# Patient Record
Sex: FEMALE | Race: WHITE | NOT HISPANIC OR LATINO | Employment: OTHER | URBAN - METROPOLITAN AREA
[De-identification: names, ages, dates, MRNs, and addresses within clinical notes are randomized per-mention and may not be internally consistent; named-entity substitution may affect disease eponyms.]

---

## 2017-01-04 ENCOUNTER — DOCUMENTATION ONLY (OUTPATIENT)
Dept: FAMILY MEDICINE | Facility: CLINIC | Age: 67
End: 2017-01-04

## 2017-01-04 NOTE — PROGRESS NOTES
Pre-Visit Chart Review  For Appointment Scheduled on 1-5-17    Health Maintenance Due   Topic Date Due    Eye Exam  07/19/1959    TETANUS VACCINE  07/19/1967    Colonoscopy  07/19/1999    Influenza Vaccine  08/01/2016

## 2017-01-05 NOTE — PROGRESS NOTES
Pre-Visit Chart Review  For Appointment Scheduled on 1-13-17    Health Maintenance Due   Topic Date Due    Eye Exam  07/19/1959    TETANUS VACCINE  07/19/1967    Colonoscopy  07/19/1999    Zoster Vaccine  07/19/2009    Pneumococcal (65+) (1 of 2 - PCV13) 07/19/2014    Mammogram  11/20/2015    Influenza Vaccine  08/01/2016    Hemoglobin A1c  11/22/2016

## 2017-01-13 ENCOUNTER — OFFICE VISIT (OUTPATIENT)
Dept: FAMILY MEDICINE | Facility: CLINIC | Age: 67
End: 2017-01-13
Payer: MEDICARE

## 2017-01-13 VITALS
OXYGEN SATURATION: 98 % | WEIGHT: 191.38 LBS | HEART RATE: 81 BPM | HEIGHT: 63 IN | RESPIRATION RATE: 12 BRPM | SYSTOLIC BLOOD PRESSURE: 102 MMHG | TEMPERATURE: 98 F | BODY MASS INDEX: 33.91 KG/M2 | DIASTOLIC BLOOD PRESSURE: 62 MMHG

## 2017-01-13 DIAGNOSIS — F45.41 STRESS HEADACHE: ICD-10-CM

## 2017-01-13 DIAGNOSIS — I73.9 PERIPHERAL VASCULAR DISEASE: ICD-10-CM

## 2017-01-13 DIAGNOSIS — E11.21 CONTROLLED TYPE 2 DIABETES MELLITUS WITH DIABETIC NEPHROPATHY, WITH LONG-TERM CURRENT USE OF INSULIN: Primary | Chronic | ICD-10-CM

## 2017-01-13 DIAGNOSIS — Z12.31 ENCOUNTER FOR SCREENING MAMMOGRAM FOR BREAST CANCER: ICD-10-CM

## 2017-01-13 DIAGNOSIS — Z79.4 CONTROLLED TYPE 2 DIABETES MELLITUS WITH DIABETIC NEPHROPATHY, WITH LONG-TERM CURRENT USE OF INSULIN: Primary | Chronic | ICD-10-CM

## 2017-01-13 DIAGNOSIS — Z12.11 COLON CANCER SCREENING: ICD-10-CM

## 2017-01-13 PROCEDURE — 99999 PR PBB SHADOW E&M-EST. PATIENT-LVL III: CPT | Mod: PBBFAC,,, | Performed by: FAMILY MEDICINE

## 2017-01-13 PROCEDURE — 99214 OFFICE O/P EST MOD 30 MIN: CPT | Mod: S$PBB,,, | Performed by: FAMILY MEDICINE

## 2017-01-13 PROCEDURE — 99213 OFFICE O/P EST LOW 20 MIN: CPT | Mod: PBBFAC,PO | Performed by: FAMILY MEDICINE

## 2017-01-13 RX ORDER — TRAMADOL HYDROCHLORIDE 50 MG/1
50 TABLET ORAL 2 TIMES DAILY
Qty: 60 TABLET | Refills: 0 | Status: SHIPPED | OUTPATIENT
Start: 2017-01-13

## 2017-01-13 RX ORDER — SULFAMETHOXAZOLE AND TRIMETHOPRIM 800; 160 MG/1; MG/1
TABLET ORAL
Refills: 0 | COMMUNITY
Start: 2017-01-09 | End: 2017-03-08 | Stop reason: ALTCHOICE

## 2017-01-13 RX ORDER — HYDROCODONE BITARTRATE AND ACETAMINOPHEN 5; 325 MG/1; MG/1
TABLET ORAL
Refills: 0 | COMMUNITY
Start: 2016-12-28 | End: 2017-03-08 | Stop reason: ALTCHOICE

## 2017-01-13 NOTE — PROGRESS NOTES
Subjective:       Patient ID: Aria Mcnulty is a 66 y.o. female.    Chief Complaint: Medication Refill    HPI Comments: 66 year old female with chronic pain secondary to peripheral ischemic vascular disease with multiple amputations of fingers, toes, left leg.  She had her last toes amputated about two months ago by Dr. Linares and is still having bleeding problems from the stumps.  She is in dialysis at [facility] with lessening problems with hypotension by her history.  She is due for mammogram and a1c and will do that with other lab at [facility] next week.  She needs a refill of tramadol.  Dr. Linares gave her a small amount of norco she uses only for extreme pain in the right foot from surgical site.      Past Medical History:    A-V fistula                                                     Comment:RIGHT upper arm    Charcot foot due to diabetes mellitus                         CHF (congestive heart failure)                                COPD (chronic obstructive pulmonary disease)                  Diabetes mellitus                                             Diabetes mellitus type II                                     Dialysis patient                                started N*      Comment:Tues, Thurs, Sat    Encounter for blood transfusion                               Hyperlipidemia                                                Hypertension                                                  Osteomyelitis                                                   Comment:RIGHT FOOT    Renal disorder                                                Sleep apnea                                                   Thyroid disease                                                 Comment:hypo    Past Surgical History:    APPENDECTOMY                                                   LEG SURGERY                                                      Comment:on R x 4    LEG AMPUTATION THROUGH LOWER TIBIA AND FIBULA                     Comment:Left    FOOT SURGERY                                                     Comment:x 5    VASCULAR SURGERY                                Right 7/2014          Comment:AV Fistula    FINGER AMPUTATION                               Left                 Comment:index finger--first digit    FINGER AMPUTATION                               Right                 Comment:index finger amp--first digit then all of                finger amputated     AV FISTULA PLACEMENT                            Right 7/22/14       FINGER AMPUTATION                               Right 8/2015        AMPUTATION OF REPLICATED TOES                    12/24/2016      Comment:Dr Linares [facility]    Eye Exam due on 07/19/1959  TETANUS VACCINE due on 07/19/1967  Colonoscopy due on 07/19/1999  Zoster Vaccine due on 07/19/2009  Mammogram due on 11/20/2015  Hemoglobin A1c due on 11/22/2016      Medication Refill   Associated symptoms include arthralgias, fatigue and myalgias. Pertinent negatives include no chest pain, chills, diaphoresis, fever, nausea or vomiting.     Review of Systems   Constitutional: Positive for fatigue. Negative for chills, diaphoresis and fever.   Respiratory: Negative for chest tightness and shortness of breath.    Cardiovascular: Negative for chest pain and palpitations.   Gastrointestinal: Negative for nausea and vomiting.   Musculoskeletal: Positive for arthralgias and myalgias.   Skin: Positive for wound.       Objective:      Physical Exam   Constitutional: She is oriented to person, place, and time. She appears well-developed. No distress.   Good blood pressure control  Patient is obese with bmi of 33.9.   Weight is decreased by 11.2lb since last visit of 8/22/16.     Neurological: She is alert and oriented to person, place, and time.   Skin: She is not diaphoretic.   Psychiatric: She has a normal mood and affect. Her behavior is normal. Judgment and thought content normal.   Nursing note and vitals reviewed.       Assessment:       1. Controlled type 2 diabetes mellitus with diabetic nephropathy, with long-term current use of insulin    2. Encounter for screening mammogram for breast cancer    3. Colon cancer screening    4. Stress headache    5. Peripheral vascular disease        Plan:       1. Controlled type 2 diabetes mellitus with diabetic nephropathy, with long-term current use of insulin  Will do a1c at [facility]    2. Encounter for screening mammogram for breast cancer  - Mammo Digital Screening Bilat with CAD; Future    3. Colon cancer screening  - POCT Hemocult Stool X3    4. Stress headache  - tramadol (ULTRAM) 50 mg tablet; Take 1 tablet (50 mg total) by mouth 2 (two) times daily.  Dispense: 60 tablet; Refill: 0    5. Peripheral vascular disease  Multiple amputations

## 2017-01-13 NOTE — MR AVS SNAPSHOT
[facility]  [address]  [place] [address]  Phone: [phone]  Fax: [phone]                  Aria Mcnulty   2017 2:20 PM   Office Visit    Description:  Female : 1950   Provider:  Humberto Regalado MD   Department:  [place] [facility]           Reason for Visit     Medication Refill           Diagnoses this Visit        Comments    Controlled type 2 diabetes mellitus with diabetic nephropathy, with long-term current use of insulin    -  Primary     Encounter for screening mammogram for breast cancer         Colon cancer screening         Stress headache                To Do List           Future Appointments        Provider Department Dept Phone    2017 3:00 PM [facility] MAMMO1 [facility] [phone]      Goals (5 Years of Data)     None       These Medications        Disp Refills Start End    tramadol (ULTRAM) 50 mg tablet 60 tablet 0 2017     Take 1 tablet (50 mg total) by mouth 2 (two) times daily. - Oral    Pharmacy: [facility] [address] [place] [address] Ph #: [phone]         [facility] On Call     [facility] On Call Nurse Care Line -  Assistance  Registered nurses in the [facility] On Call Center provide clinical advisement, health education, appointment booking, and other advisory services.  Call for this free service at [phone].             Medications           Message regarding Medications     Verify the changes and/or additions to your medication regime listed below are the same as discussed with your clinician today.  If any of these changes or additions are incorrect, please notify your healthcare provider.        STOP taking these medications     cephALEXin (KEFLEX) 500 MG capsule 1 Q 12 H FOR 7 DAYS           Verify that the below list of medications is an accurate representation of the medications you are currently taking.  If none reported, the list may be  "blank. If incorrect, please contact your healthcare provider. Carry this list with you in case of emergency.           Current Medications     acetaminophen-codeine 300-60mg (TYLENOL #4) 300-60 mg Tab Take 1 tablet by mouth 4 (four) times daily as needed.    calcium acetate (PHOSLO) 667 mg capsule Take 1,334 mg by mouth 4 (four) times daily.     collagenase (SANTYL) ointment Apply topical    furosemide (LASIX) 40 MG tablet Take 40 mg by mouth once daily.     hydrocodone-acetaminophen 5-325mg (NORCO) 5-325 mg per tablet TK 1 T PO  Q 8 H PRN    lidocaine-prilocaine (EMLA) cream Apply topically as needed.     metolazone (ZAROXOLYN) 5 MG tablet Take 5 mg by mouth every Monday and Friday. Every Monday and Friday only    pen needle, diabetic 32 gauge x 5/16" Ndle Use with levemir at bedtime    promethazine (PHENERGAN) 25 MG tablet 1 EVERY 4 HOURS    sevelamer carbonate (RENVELA) 800 mg Tab Take 800 mg by mouth 3 (three) times daily with meals.    silver sulfADIAZINE 1% (SILVADENE) 1 % cream Apply topically 2 (two) times daily.    sulfamethoxazole-trimethoprim 800-160mg (BACTRIM DS) 800-160 mg Tab TK 1 T PO BID    clopidogrel (PLAVIX) 75 mg tablet Take 1 tablet (75 mg total) by mouth once daily.    insulin detemir (LEVEMIR FLEXTOUCH) 100 unit/mL (3 mL) SubQ InPn pen Inject 10 Units into the skin every evening.    pravastatin (PRAVACHOL) 10 MG tablet Take 1 tablet (10 mg total) by mouth every evening.    tramadol (ULTRAM) 50 mg tablet Take 1 tablet (50 mg total) by mouth 2 (two) times daily.           Clinical Reference Information           Vital Signs - Last Recorded  Most recent update: 1/13/2017  3:48 PM by Ely Lomeli MA    BP Pulse Temp Resp Ht Wt    102/62 (BP Location: Right arm, Patient Position: Sitting, BP Method: Automatic) 81 98 °F (36.7 °C) (Oral) 12 5' 3" (1.6 m) 86.8 kg (191 lb 5.8 oz)    SpO2 BMI             98% 33.9 kg/m2         Blood Pressure          Most Recent Value    BP  102/62      Allergies as " of 1/13/2017     Cat/feline Products    Pollen Extracts    Adhesive      Immunizations Administered on Date of Encounter - 1/13/2017     None      Orders Placed During Today's Visit      Normal Orders This Visit    POCT Hemocult Stool X3     Future Labs/Procedures Expected by Expires    Mammo Digital Screening Bilat with CAD  1/13/2017 3/16/2018      Maintenance Dialysis History     Start End Type Comments Center    11/11/2014  [place]  [facility]            Current Dialysis Center Information     [facility] [place] Phone #:  [phone]    Contact:  [place]  [address] Fax #:  [phone]            Instructions      Diabetes (General Information)  Diabetes is a long-term health problem. It means your body does not make enough insulin. Or it may mean that your body cannot use the insulin it makes. Insulin is a hormone in your body. It lets blood sugar (glucose) reach the cells in your body. All of your cells need glucose for fuel.  When you have diabetes, the glucose in your blood builds up because it cannot get into the cells. This buildup is called high blood sugar (hyperglycemia).  Your blood sugar level depends on several things. It depends on what kind of food you eat and how much of it you eat. It also depends on how much exercise you get, and how much insulin you have in your body. Eating too much of the wrong kinds of food or not taking diabetes medicine on time can cause high blood sugar. Infections can cause high blood sugar even if you are taking medicines correctly.  These things can also cause low blood sugar:  · Missing meals  · Not eating enough food  · Taking too much diabetes medicine  Diabetes can cause serious problems over time if you do not get treated. These problems include heart disease, stroke, kidney failure, and blindness. They also include nerve pain or loss of feeling in your legs and feet, and gangrene of the feet. By keeping your blood  sugar under control you can prevent or delay these problems.  Normal blood sugar levels are 80 to 100 before a meal and less than 180 in the 1 to 2 hours after a meal.  Home care  Follow these guidelines when caring for yourself at home:  · Follow the diet your healthcare provider gives you. Take insulin or other diabetes medicine exactly as told to.  · Watch your blood sugar as you are told to. Keep a log of your results. This will help your provider change your medicines to keep your blood sugar under control.  · Try to reach your ideal weight. You may be able to cut back on or not have to take diabetes medicine if you eat the right foods and get exercise.  · Do not smoke. Smoking worsens the effects of diabetes on your circulation. You are much more likely to have a heart attack if you have diabetes and you smoke.  · Take good care of your feet. If you have lost feeling in your feet, you may not see an injury or infection. Check your feet and between your toes at least once a week.  · Wear a medical alert bracelet or necklace, or carry a card in your wallet that says you have diabetes. This will help healthcare providers give you the right care if you get very ill and cannot tell them that you have diabetes.  Sick day plan  If you get a cold, the flu, or a bacterial or viral infection, take these steps:  · Look at your diabetes sick plan and call your healthcare provider as you were told to. You may need to call your provider right away if:  ¨ Your blood sugar is above 240 while taking your diabetes medicine  ¨ Your urine ketone levels are above normal or high  ¨ You have been vomiting more than 6 hours  ¨ You have trouble breathing or your breath ha s a fruity smell  ¨ You have a high fever  ¨ You have a fever for several days and you are not getting better  ¨ You get light-headed and are sleepier than usual  · Keep taking your diabetes pills (oral medicine) even if you have been vomiting and are feeling sick.  Call your provider right away because you may need insulin to lower your blood sugar until you recover from your illness.  · Keep taking your insulin even if you have been vomiting and are feeling sick. Call your provider right away to ask if you need to change your insulin dose. This will depend on your blood sugar results.  · Check your blood sugar every 2 to 4 hours, or at least 4 times a day.  · Check your ketones often. If you are vomiting and having diarrhea, watch them more often.  · Do not skip meals. Try to eat small meals on a regular schedule. Do this even if you do not feel like eating.  · Drink water or other liquids that do not have caffeine or calories. This will keep you from getting dehydrated. If you are nauseated or vomiting, takes small sips every 5 minutes. To prevent dehydration try to drink a cup (8 ounces) of fluids every hour while you are awake.  General care  Always bring a source of fast-acting sugar with you in case you have symptoms of low blood sugar (below 70). At the first sign of low blood sugar, eat or drink 15 to 20 grams of fast-acting sugar to raise your blood sugar. Examples are:  · 3 to 4 glucose tablets. You can buy these at most [employer] [employer].  · 4 ounces (1/2 cup) of regular (not diet) soft drinks  · 4 ounces (1/2 cup) of any fruit juice  · 8 ounces (1 cup) of milk  · 5 to 6 pieces of hard candy  · 1 tablespoon of honey  Check your blood sugar 15 minutes after treating yourself. If it is still below 70, take 15 to 20 more grams of fast-acting sugar. Test again in 15 minutes. If it returns to normal (70 or above), eat a snack or meal to keep your blood sugar in a safe range. If it stays low, call your doctor or go to an emergency room.  Follow-up care  Follow-up with your healthcare provider, or as advised. For more information about diabetes, visit the American Diabetes Association website at www.diabetes.org or call 695-417-1187.  When to seek medical advice  Call your  healthcare provider right away if you have any of these symptoms of high blood sugar:  · Frequent urination  · Dizziness  · Drowsiness  · Thirst  · Headache  · Nausea or vomiting  · Abdominal pain  · Eyesight changes  · Fast breathing  · Confusion or loss of consciousness  Also call your provider right away if you have any of these signs of low blood sugar:  · Fatigue  · Headache  · Shakes  · Excess sweating  · Hunger  · Feeling anxious or restless  · Eyesight changes  · Drowsiness  · Weakness  · Confusion or loss of consciousness  Call 911  Call for emergency help right away if any of these occur:  · Chest pain or shortness of breath  · Dizziness or fainting  · Weakness of an arm or leg or one side of the face  · Trouble speaking or seeing   © 2238-6541 [employer]. [address] [place] [address], [place] [address]. All rights reserved. This information is not intended as a substitute for professional medical care. Always follow your healthcare professional's instructions.

## 2017-01-13 NOTE — PATIENT INSTRUCTIONS
Diabetes (General Information)  Diabetes is a long-term health problem. It means your body does not make enough insulin. Or it may mean that your body cannot use the insulin it makes. Insulin is a hormone in your body. It lets blood sugar (glucose) reach the cells in your body. All of your cells need glucose for fuel.  When you have diabetes, the glucose in your blood builds up because it cannot get into the cells. This buildup is called high blood sugar (hyperglycemia).  Your blood sugar level depends on several things. It depends on what kind of food you eat and how much of it you eat. It also depends on how much exercise you get, and how much insulin you have in your body. Eating too much of the wrong kinds of food or not taking diabetes medicine on time can cause high blood sugar. Infections can cause high blood sugar even if you are taking medicines correctly.  These things can also cause low blood sugar:  · Missing meals  · Not eating enough food  · Taking too much diabetes medicine  Diabetes can cause serious problems over time if you do not get treated. These problems include heart disease, stroke, kidney failure, and blindness. They also include nerve pain or loss of feeling in your legs and feet, and gangrene of the feet. By keeping your blood sugar under control you can prevent or delay these problems.  Normal blood sugar levels are 80 to 100 before a meal and less than 180 in the 1 to 2 hours after a meal.  Home care  Follow these guidelines when caring for yourself at home:  · Follow the diet your healthcare provider gives you. Take insulin or other diabetes medicine exactly as told to.  · Watch your blood sugar as you are told to. Keep a log of your results. This will help your provider change your medicines to keep your blood sugar under control.  · Try to reach your ideal weight. You may be able to cut back on or not have to take diabetes medicine if you eat the right foods and get exercise.  · Do  not smoke. Smoking worsens the effects of diabetes on your circulation. You are much more likely to have a heart attack if you have diabetes and you smoke.  · Take good care of your feet. If you have lost feeling in your feet, you may not see an injury or infection. Check your feet and between your toes at least once a week.  · Wear a medical alert bracelet or necklace, or carry a card in your wallet that says you have diabetes. This will help healthcare providers give you the right care if you get very ill and cannot tell them that you have diabetes.  Sick day plan  If you get a cold, the flu, or a bacterial or viral infection, take these steps:  · Look at your diabetes sick plan and call your healthcare provider as you were told to. You may need to call your provider right away if:  ¨ Your blood sugar is above 240 while taking your diabetes medicine  ¨ Your urine ketone levels are above normal or high  ¨ You have been vomiting more than 6 hours  ¨ You have trouble breathing or your breath ha s a fruity smell  ¨ You have a high fever  ¨ You have a fever for several days and you are not getting better  ¨ You get light-headed and are sleepier than usual  · Keep taking your diabetes pills (oral medicine) even if you have been vomiting and are feeling sick. Call your provider right away because you may need insulin to lower your blood sugar until you recover from your illness.  · Keep taking your insulin even if you have been vomiting and are feeling sick. Call your provider right away to ask if you need to change your insulin dose. This will depend on your blood sugar results.  · Check your blood sugar every 2 to 4 hours, or at least 4 times a day.  · Check your ketones often. If you are vomiting and having diarrhea, watch them more often.  · Do not skip meals. Try to eat small meals on a regular schedule. Do this even if you do not feel like eating.  · Drink water or other liquids that do not have caffeine or  calories. This will keep you from getting dehydrated. If you are nauseated or vomiting, takes small sips every 5 minutes. To prevent dehydration try to drink a cup (8 ounces) of fluids every hour while you are awake.  General care  Always bring a source of fast-acting sugar with you in case you have symptoms of low blood sugar (below 70). At the first sign of low blood sugar, eat or drink 15 to 20 grams of fast-acting sugar to raise your blood sugar. Examples are:  · 3 to 4 glucose tablets. You can buy these at most [facility].  · 4 ounces (1/2 cup) of regular (not diet) soft drinks  · 4 ounces (1/2 cup) of any fruit juice  · 8 ounces (1 cup) of milk  · 5 to 6 pieces of hard candy  · 1 tablespoon of honey  Check your blood sugar 15 minutes after treating yourself. If it is still below 70, take 15 to 20 more grams of fast-acting sugar. Test again in 15 minutes. If it returns to normal (70 or above), eat a snack or meal to keep your blood sugar in a safe range. If it stays low, call your doctor or go to an emergency room.  Follow-up care  Follow-up with your healthcare provider, or as advised. For more information about diabetes, visit the American Diabetes Association website at www.diabetes.org or call 126-110-0952.  When to seek medical advice  Call your healthcare provider right away if you have any of these symptoms of high blood sugar:  · Frequent urination  · Dizziness  · Drowsiness  · Thirst  · Headache  · Nausea or vomiting  · Abdominal pain  · Eyesight changes  · Fast breathing  · Confusion or loss of consciousness  Also call your provider right away if you have any of these signs of low blood sugar:  · Fatigue  · Headache  · Shakes  · Excess sweating  · Hunger  · Feeling anxious or restless  · Eyesight changes  · Drowsiness  · Weakness  · Confusion or loss of consciousness  Call 911  Call for emergency help right away if any of these occur:  · Chest pain or shortness of breath  · Dizziness or  fainting  · Weakness of an arm or leg or one side of the face  · Trouble speaking or seeing   © 7266-6023 [employer]. [address] [place] [address], [place] [address]. All rights reserved. This information is not intended as a substitute for professional medical care. Always follow your healthcare professional's instructions.

## 2017-01-27 ENCOUNTER — TELEPHONE (OUTPATIENT)
Dept: FAMILY MEDICINE | Facility: CLINIC | Age: 67
End: 2017-01-27

## 2017-01-27 NOTE — TELEPHONE ENCOUNTER
LVM for pt to return call to clinic regarding if she has recently had an eye exam done. If she has please find out where so we can send off a request for report. Also offered an apt within [facility] if she doesn't already have a doctor.

## 2017-02-23 ENCOUNTER — TELEPHONE (OUTPATIENT)
Dept: FAMILY MEDICINE | Facility: CLINIC | Age: 67
End: 2017-02-23

## 2017-02-23 NOTE — TELEPHONE ENCOUNTER
Received lab report from [facility] ordered by Dr Isbell.   Glucose 160 H . mg/dl  70- 105  (fyi)

## 2017-02-28 NOTE — TELEPHONE ENCOUNTER
Three months overdue for a1c.  It is difficult having to depend on [facility] for labs.  Would recommend we sent her to endocrine clinic.

## 2017-03-01 ENCOUNTER — TELEPHONE (OUTPATIENT)
Dept: FAMILY MEDICINE | Facility: CLINIC | Age: 67
End: 2017-03-01

## 2017-03-01 NOTE — TELEPHONE ENCOUNTER
----- Message from Wendy Carlson sent at 3/1/2017  2:29 PM CST -----  Contact: self [phone]  Patient is requesting a call back from the nurse stated legs puffy, requesting sooner appt than whats offered.   Please call the patient upon request at phone number [phone].

## 2017-03-02 ENCOUNTER — DOCUMENTATION ONLY (OUTPATIENT)
Dept: FAMILY MEDICINE | Facility: CLINIC | Age: 67
End: 2017-03-02

## 2017-03-02 NOTE — PROGRESS NOTES
Pre-Visit Chart Review  For Appointment Scheduled on 3-8-17    Health Maintenance Due   Topic Date Due    Eye Exam  07/19/1959    TETANUS VACCINE  07/19/1967    Colonoscopy  07/19/1999    Zoster Vaccine  07/19/2009    Mammogram  11/20/2015    Hemoglobin A1c  11/22/2016

## 2017-03-08 ENCOUNTER — OFFICE VISIT (OUTPATIENT)
Dept: FAMILY MEDICINE | Facility: CLINIC | Age: 67
End: 2017-03-08
Payer: MEDICARE

## 2017-03-08 VITALS — BODY MASS INDEX: 32.5 KG/M2 | RESPIRATION RATE: 12 BRPM | WEIGHT: 183.44 LBS | HEIGHT: 63 IN | TEMPERATURE: 98 F

## 2017-03-08 DIAGNOSIS — R60.9 EDEMA, UNSPECIFIED TYPE: Primary | ICD-10-CM

## 2017-03-08 DIAGNOSIS — G89.29 OTHER CHRONIC PAIN: ICD-10-CM

## 2017-03-08 DIAGNOSIS — N18.5 CKD (CHRONIC KIDNEY DISEASE) STAGE 5, GFR LESS THAN 15 ML/MIN: ICD-10-CM

## 2017-03-08 PROCEDURE — 99999 PR PBB SHADOW E&M-EST. PATIENT-LVL III: CPT | Mod: PBBFAC,,, | Performed by: FAMILY MEDICINE

## 2017-03-08 PROCEDURE — 99213 OFFICE O/P EST LOW 20 MIN: CPT | Mod: PBBFAC,PO | Performed by: FAMILY MEDICINE

## 2017-03-08 PROCEDURE — 99214 OFFICE O/P EST MOD 30 MIN: CPT | Mod: S$PBB,,, | Performed by: FAMILY MEDICINE

## 2017-03-08 RX ORDER — ACETAMINOPHEN AND CODEINE PHOSPHATE 300; 60 MG/1; MG/1
1 TABLET ORAL 4 TIMES DAILY PRN
Qty: 120 TABLET | Refills: 0 | Status: SHIPPED | OUTPATIENT
Start: 2017-03-08 | End: 2017-06-06 | Stop reason: SDUPTHER

## 2017-03-08 RX ORDER — POLYETHYLENE GLYCOL 3350 17 G/17G
POWDER, FOR SOLUTION ORAL
Refills: 0 | COMMUNITY
Start: 2017-02-15 | End: 2017-06-06 | Stop reason: ALTCHOICE

## 2017-03-08 NOTE — PATIENT INSTRUCTIONS
Weight Management: Getting Started  Healthy bodies come in all shapes and sizes. Not all bodies are made to be thin. For some people, a healthy weight is higher than the average weight listed on weight charts. Your healthcare provider can help you decide on a healthy weight for you.    Reasons to lose weight  Losing weight can help with some health problems, such as high blood pressure, heart disease, diabetes, sleep apnea, and arthritis. You may also feel more energy.  Set your long-term goal  Your goal doesn't even have to be a specific weight. You may decide on a fitness goal (such as being able to walk 10 miles a week), or a health goal (such as lowering your blood pressure). Choose a goal that is measurable and reasonable, so you know when you've reached it. A goal of reaching a BMI of less than 25 is not always reasonable (or possible).   Make an action plan  Habits dont change overnight. Setting your goals too high can leave you feeling discouraged if you cant reach them. Be realistic. Choose one or two small changes you can make now. Set an action plan for how you are going to make these changes. When you can stick to this plan, keep making a few more small changes. Taking small steps will help you stay on the path to success.  Track your progress  Write down your goals. Then, keep a daily record of your progress. Write down what you eat and how active you are. This record lets you look back on how much youve done. It may also help when youre feeling frustrated. Reward yourself for success. Even if you dont reach every goal, give yourself credit for what you do get done.  Get support  Encouragement from others can help make losing weight easier. Ask your family members and friends for support. They may even want to join you. Also look to your healthcare provider, registered dietitian, and  for help. Your local hospital can give you more information about nutrition, exercise, and  weight loss.  Date Last Reviewed: 1/31/2016  © 0581-8324 [employer]. [address] [place] [address], [place] [address]. All rights reserved. This information is not intended as a substitute for professional medical care. Always follow your healthcare professional's instructions.

## 2017-03-08 NOTE — PROGRESS NOTES
Subjective:       Patient ID: Aria Mcnulty is a 66 y.o. female.    Chief Complaint: Leg Swelling    HPI Comments: 66 year old female comes in for edema.  She is a severe diabetic with peripheral vascular disease and stage five kidney disease on dialysis at [facility].  She just got out of [facility] having had her remaining toes on the right foot amputated.  Dialysis pulled off extra fluid and largely resolved the edema.  Home health was wrapping the right leg but has stopped lately.      Past Medical History:  No date: A-V fistula      Comment: RIGHT upper arm  No date: Charcot foot due to diabetes mellitus  No date: CHF (congestive heart failure)  No date: COPD (chronic obstructive pulmonary disease)  No date: Diabetes mellitus  No date: Diabetes mellitus type II  started Nov 2014: Dialysis patient      Comment: Tues, Thurs, Sat  No date: Encounter for blood transfusion  No date: Hyperlipidemia  No date: Hypertension  No date: Osteomyelitis      Comment: RIGHT FOOT  No date: Renal disorder  No date: Sleep apnea  No date: Thyroid disease      Comment: hypo    Past Surgical History:  12/24/2016: AMPUTATION OF REPLICATED TOES      Comment: Dr Linares [facility]  No date: APPENDECTOMY  7/22/14: AV FISTULA PLACEMENT Right  No date: FINGER AMPUTATION Left      Comment: index finger--first digit  No date: FINGER AMPUTATION Right      Comment: index finger amp--first digit then all of                finger amputated   8/2015: FINGER AMPUTATION Right  No date: FOOT SURGERY      Comment: x 5  No date: LEG AMPUTATION THROUGH LOWER TIBIA AND FIBULA      Comment: Left  No date: LEG SURGERY      Comment: on R x 4  7/2014: VASCULAR SURGERY Right      Comment: AV Fistula        Review of Systems   Cardiovascular: Positive for leg swelling.   Skin: Positive for wound (slowly healing wounds on right lower leg).       Objective:      Physical Exam   Constitutional: She is oriented to person, place, and time. She appears well-developed. No distress.  "  ---------------------------------                  03/08/17                            1101            ---------------------------------   Resp:             12              Temp:      98.1 °F (36.7 °C)      TempSrc:         Oral             Weight: 83.2 kg (183 lb 6.8 oz)   Height:      5' 3" (1.6 m)       ---------------------------------  Unable to obtain blood pressure, no reading  Patient is obese with bmi of 32.5.   Weight is decreased by 7.9lb since last visit of 1/13/17.     Cardiovascular: Normal rate and regular rhythm.    Pulmonary/Chest: Effort normal and breath sounds normal.   Musculoskeletal:   Left leg amputated, no toes on right foot, glenn of fingers on right hand only   Neurological: She is alert and oriented to person, place, and time.   Skin: She is not diaphoretic.   Psychiatric: She has a normal mood and affect. Her behavior is normal. Judgment and thought content normal.   Nursing note and vitals reviewed.      Assessment:       1. Other chronic pain    2. Uncontrolled type 2 diabetes mellitus with nephropathy        Plan:       1. Edema, unspecified type  Related to renal function, she has a moderate salt intake-ate steak and baked potato last night    2. CKD (chronic kidney disease) stage 5, GFR less than 15 ml/min    3. Uncontrolled type 2 diabetes mellitus with nephropathy  In dialysis at [facility]  - Hemoglobin A1c; Future    4. Other chronic pain  - acetaminophen-codeine 300-60mg (TYLENOL #4) 300-60 mg Tab; Take 1 tablet by mouth 4 (four) times daily as needed.  Dispense: 120 tablet; Refill: 0       "

## 2017-03-09 ENCOUNTER — TELEPHONE (OUTPATIENT)
Dept: FAMILY MEDICINE | Facility: CLINIC | Age: 67
End: 2017-03-09

## 2017-03-09 NOTE — TELEPHONE ENCOUNTER
Reviewed results, recent lipid panel added to HM.  No changes needed, glucose and lipids are well controlled.

## 2017-03-10 ENCOUNTER — TELEPHONE (OUTPATIENT)
Dept: FAMILY MEDICINE | Facility: CLINIC | Age: 67
End: 2017-03-10

## 2017-03-10 NOTE — TELEPHONE ENCOUNTER
Naveed with Hal  called -bp  78/38 82/38 88/42- at [facility] pressure was in 90s- pulse 72 sats 93-95-patient looks jaundiced -took 4L off at dialysis yesterday.  nurse told to have patient taken to er.

## 2017-03-11 ENCOUNTER — HISTORICAL (OUTPATIENT)
Dept: ADMINISTRATIVE | Facility: HOSPITAL | Age: 67
End: 2017-03-11

## 2017-03-11 LAB — GLUCOSE SERPL-MCNC: 277 MG/DL (ref 70–99)

## 2017-04-19 ENCOUNTER — HISTORICAL (OUTPATIENT)
Dept: ADMINISTRATIVE | Facility: HOSPITAL | Age: 67
End: 2017-04-19

## 2017-04-19 LAB
GRAM STAIN: NORMAL

## 2017-04-20 ENCOUNTER — TELEPHONE (OUTPATIENT)
Dept: FAMILY MEDICINE | Facility: CLINIC | Age: 67
End: 2017-04-20

## 2017-04-20 NOTE — TELEPHONE ENCOUNTER
Patient was in [facility] then to [facility]- appt made 5/5/17- records request from [facility].

## 2017-04-20 NOTE — TELEPHONE ENCOUNTER
----- Message from Reny Izquierdo sent at 4/20/2017  1:37 PM CDT -----  Contact: marzena with [facility] [phone]  Marzena with [facility] called regarding a Follow up appointment from rehab please call back at  [phone] to confirm. I tried to schedule appointment.stated was too far out. Thanks,

## 2017-05-02 ENCOUNTER — TELEPHONE (OUTPATIENT)
Dept: FAMILY MEDICINE | Facility: CLINIC | Age: 67
End: 2017-05-02

## 2017-05-02 NOTE — TELEPHONE ENCOUNTER
----- Message from RT Telly sent at 5/2/2017 10:41 AM CDT -----  Contact: CHRISTINA Hernandez, [phone] [facility]  CHRISTINA Hernandez, [phone] [facility], requesting to inform the pt's wound is draining very much and would like a wound vac for her, thanks

## 2017-05-02 NOTE — TELEPHONE ENCOUNTER
Per Mary wound nurse- right stump wound is draining and asking for wound vac order. Was in [facility] in April for gangrene. Records are in media. Will need written order to fax to [facility].

## 2017-05-03 DIAGNOSIS — E11.9 TYPE 2 DIABETES, HBA1C GOAL < 7%: Primary | ICD-10-CM

## 2017-05-04 ENCOUNTER — DOCUMENTATION ONLY (OUTPATIENT)
Dept: FAMILY MEDICINE | Facility: CLINIC | Age: 67
End: 2017-05-04

## 2017-05-04 ENCOUNTER — TELEPHONE (OUTPATIENT)
Dept: FAMILY MEDICINE | Facility: CLINIC | Age: 67
End: 2017-05-04

## 2017-05-04 NOTE — TELEPHONE ENCOUNTER
----- Message from Jenna Robledo sent at 5/4/2017 10:50 AM CDT -----  Liana with [employer] calling concerning Woundvac order/stated faxed over forms to be signed and dated/if any questions call back at [phone].

## 2017-05-04 NOTE — TELEPHONE ENCOUNTER
Spoke to Liana with [employer]. Informed her that we did not receive the faxed forms that she sent.  She is re-faxing to correct fax # [phone].   Please fax back to [phone] when complete.

## 2017-05-04 NOTE — PROGRESS NOTES
Pre-Visit Chart Review  For Appointment Scheduled on 05/05/2017      Health Maintenance Due   Topic Date Due    Eye Exam  07/19/1959    TETANUS VACCINE  07/19/1967    Colonoscopy  07/19/1999    Zoster Vaccine  07/19/2009    Mammogram  11/20/2015

## 2017-05-05 ENCOUNTER — OFFICE VISIT (OUTPATIENT)
Dept: FAMILY MEDICINE | Facility: CLINIC | Age: 67
End: 2017-05-05
Payer: MEDICARE

## 2017-05-05 VITALS
WEIGHT: 183.19 LBS | BODY MASS INDEX: 32.46 KG/M2 | HEART RATE: 98 BPM | HEIGHT: 63 IN | DIASTOLIC BLOOD PRESSURE: 60 MMHG | TEMPERATURE: 98 F | SYSTOLIC BLOOD PRESSURE: 106 MMHG

## 2017-05-05 DIAGNOSIS — Z99.2 ESRD (END STAGE RENAL DISEASE) ON DIALYSIS: Chronic | ICD-10-CM

## 2017-05-05 DIAGNOSIS — Z12.39 SCREENING FOR BREAST CANCER: ICD-10-CM

## 2017-05-05 DIAGNOSIS — Z89.611 S/P AKA (ABOVE KNEE AMPUTATION) UNILATERAL, RIGHT: Primary | ICD-10-CM

## 2017-05-05 DIAGNOSIS — Z89.612 HX OF AKA (ABOVE KNEE AMPUTATION), LEFT: ICD-10-CM

## 2017-05-05 DIAGNOSIS — E66.9 OBESITY (BMI 30.0-34.9): ICD-10-CM

## 2017-05-05 DIAGNOSIS — E11.8 CONTROLLED TYPE 2 DIABETES MELLITUS WITH COMPLICATION, WITHOUT LONG-TERM CURRENT USE OF INSULIN: ICD-10-CM

## 2017-05-05 DIAGNOSIS — Z12.31 ENCOUNTER FOR SCREENING MAMMOGRAM FOR MALIGNANT NEOPLASM OF BREAST: ICD-10-CM

## 2017-05-05 DIAGNOSIS — I10 ESSENTIAL HYPERTENSION: Chronic | ICD-10-CM

## 2017-05-05 DIAGNOSIS — N18.6 ESRD (END STAGE RENAL DISEASE) ON DIALYSIS: Chronic | ICD-10-CM

## 2017-05-05 PROCEDURE — 99214 OFFICE O/P EST MOD 30 MIN: CPT | Mod: PBBFAC,PO | Performed by: PHYSICIAN ASSISTANT

## 2017-05-05 PROCEDURE — 99214 OFFICE O/P EST MOD 30 MIN: CPT | Mod: S$PBB,,, | Performed by: PHYSICIAN ASSISTANT

## 2017-05-05 PROCEDURE — 99999 PR PBB SHADOW E&M-EST. PATIENT-LVL IV: CPT | Mod: PBBFAC,,, | Performed by: PHYSICIAN ASSISTANT

## 2017-05-05 NOTE — MR AVS SNAPSHOT
[facility]  [address]  [place] [address]  Phone: [phone]  Fax: [phone]                  Aria Mcnulty   2017 3:40 PM   Office Visit    Description:  Female : 1950   Provider:  ARETHA Gilbert   Department:  [facility]           Reason for Visit     Hospital Follow Up           Diagnoses this Visit        Comments    Controlled type 2 diabetes mellitus with complication, without long-term current use of insulin    -  Primary     Screening for breast cancer         Encounter for screening mammogram for malignant neoplasm of breast                To Do List           Future Appointments        Provider Department Dept Phone    2017 1:20 PM Humberto Regalado MD [facility] [phone]    2017 2:15 PM [facility]- Mammography [phone]      Goals (5 Years of Data)     None      Follow-Up and Disposition     Return for 1 month f/u with Dr. Regalado.      [facility]     [facility] Nurse Care Line -  Assistance  Unless otherwise directed by your provider, please contact [facility], our nurse care line that is available for  assistance.     Registered nurses in the [facility] provide: appointment scheduling, clinical advisement, health education, and other advisory services.  Call: [phone] (toll free)               Medications           Message regarding Medications     Verify the changes and/or additions to your medication regime listed below are the same as discussed with your clinician today.  If any of these changes or additions are incorrect, please notify your healthcare provider.             Verify that the below list of medications is an accurate representation of the medications you are currently taking.  If none reported, the list may be blank. If incorrect, please contact your healthcare provider. Carry this list with you in case of emergency.           Current  "Medications     acetaminophen-codeine 300-60mg (TYLENOL #4) 300-60 mg Tab Take 1 tablet by mouth 4 (four) times daily as needed.    calcium acetate (PHOSLO) 667 mg capsule Take 1,334 mg by mouth 4 (four) times daily.     clopidogrel (PLAVIX) 75 mg tablet Take 1 tablet (75 mg total) by mouth once daily.    collagenase (SANTYL) ointment Apply topical    furosemide (LASIX) 40 MG tablet Take 40 mg by mouth once daily.     lidocaine-prilocaine (EMLA) cream Apply topically as needed.     metolazone (ZAROXOLYN) 5 MG tablet Take 5 mg by mouth every Monday and Friday. Every Monday and Friday only    polyethylene glycol (GLYCOLAX) 17 gram PwPk TK 1 PACKET PO QD PRN AS DIRECTED    promethazine (PHENERGAN) 25 MG tablet 1 EVERY 4 HOURS    sevelamer carbonate (RENVELA) 800 mg Tab Take 800 mg by mouth 3 (three) times daily with meals.    silver sulfADIAZINE 1% (SILVADENE) 1 % cream Apply topically 2 (two) times daily.    tramadol (ULTRAM) 50 mg tablet Take 1 tablet (50 mg total) by mouth 2 (two) times daily.           Clinical Reference Information           Your Vitals Were     BP Pulse Temp Height Weight BMI    106/60 (BP Location: Left arm, Patient Position: Sitting, BP Method: Automatic) 98 97.8 °F (36.6 °C) (Oral) 5' 3" (1.6 m) 83.1 kg (183 lb 3.2 oz) 32.45 kg/m2      Blood Pressure          Most Recent Value    BP  106/60      Allergies as of 5/5/2017     Cat/feline Products    Pollen Extracts    Adhesive      Immunizations Administered on Date of Encounter - 5/5/2017     None      Orders Placed During Today's Visit      Normal Orders This Visit    Ambulatory referral to Optometry     Future Labs/Procedures Expected by Expires    Mammo Digital Screening Bilateral With CAD  5/5/2017 7/5/2018      Maintenance Dialysis History     Start End Type Comments Center    11/11/2014  [facility]  [facility]            Current Dialysis Center Information     [facility] [address] Phone #:  [phone] "    Contact:  N/A [place]LA[place] PRIET[place]  [address] Fax #:  [phone]            Language Assistance Services     ATTENTION: Language assistance services are available, free of charge. Please call [phone].      ATENCIÓN: Si habla sindy, tiene a mckeon disposición servicios gratuitos de asistencia lingüística. Llame al [phone].     CHÚ Ý: N?u b?n nói Ti?ng Vi?t, có các d?ch v? h? tr? ngôn ng? mi?n phí dành cho b?n. G?i s? [phone].         [place] [facility] complies with applicable Federal civil rights laws and does not discriminate on the basis of race, color, national origin, age, disability, or sex.

## 2017-05-05 NOTE — PROGRESS NOTES
Subjective:       Patient ID: Aria Mcnulty is a 66 y.o. female.    Chief Complaint: Hospital Follow Up    HPI   Patient is a 66 year old  female with HTN, Hypothyroidism, DM II, ESRD on dialysis, COPD, CHF, Charcot's arthropath, Neuropathy, DDD lumbar, PVD presenting tot he clinic for hospital follow-up from [facility] 3/22/17 for Chronic gangrenous wound with secondary infection of non healing right foot stump. She developed Enterobacter cloacae bacteria secondary to above. She was placed on IV Fortaz & Moxifloxacin PO til 4/5/17. She had successful AKA with Dr. Huerta. She saw nephrology during hospitilization and [facility] where she had PT/OT & wound care.  She has been seeing Dr. Huerta since hospitalization. [facility], nursing, and wound care. She sees Dr. Rodriguez, but will not undergo CABG until her leg wound is close up. Today, she has purulent drainage from her left stump. She reports wound care is following this as well & has actually improved. She does not want to see a separate wound care doctor because it is hard for her to get places. She has seen Dr. Huerta since hospitalization & has an apt next week. She is otherwise doing well.   Review of Systems   Constitutional: Negative for activity change, appetite change, chills, diaphoresis, fatigue and fever.   HENT: Negative for congestion, postnasal drip and rhinorrhea.    Respiratory: Negative.  Negative for cough, shortness of breath and wheezing.    Cardiovascular: Negative.  Negative for chest pain.   Gastrointestinal: Negative for abdominal pain, blood in stool, constipation, diarrhea, nausea and vomiting.   Genitourinary: Negative for dysuria, frequency, hematuria and urgency.   Musculoskeletal: Negative.    Skin: Positive for wound. Negative for color change, pallor and rash.   Neurological: Negative for dizziness and syncope.    Psychiatric/Behavioral: Negative for agitation, behavioral problems and confusion.       Objective:      Physical Exam   Constitutional: Vital signs are normal. She appears well-developed and well-nourished. No distress.   Cardiovascular: Normal rate, regular rhythm, S1 normal, S2 normal and normal heart sounds.  Exam reveals no gallop.    No murmur heard.  Pulses:       Radial pulses are 2+ on the right side, and 2+ on the left side.   <2sec cap refill fingers bilat     Pulmonary/Chest: Effort normal and breath sounds normal. No respiratory distress. She has no wheezes. She has no rhonchi.   Skin: Skin is warm and dry. She is not diaphoretic.        Appropriate skin turgor   Psychiatric: She has a normal mood and affect. Her speech is normal and behavior is normal. Judgment and thought content normal. Cognition and memory are normal.       Assessment:       1. S/P AKA (above knee amputation) unilateral, right    2. Hx of AKA (above knee amputation), left    3. Controlled type 2 diabetes mellitus with complication, without long-term current use of insulin    4. Essential hypertension    5. ESRD on HD (Tues, Thurs, Sat)    6. Screening for breast cancer    7. Encounter for screening mammogram for malignant neoplasm of breast     8. Obesity (BMI 30.0-34.9)        Plan:   Aria was seen today for hospital follow up.    Diagnoses and all orders for this visit:    S/P AKA (above knee amputation) unilateral, right  Continue close follow-up with Dr. Huerta  Continue follow-up with wound care as outpatient    Hx of AKA (above knee amputation), left  Continue close f/u with Wound care; if worsening- consider referral to [facility]    Controlled type 2 diabetes mellitus with complication, without long-term current use of insulin  -     Ambulatory referral to Optometry    Essential hypertension  Good control; continue current medications    ESRD on HD (Tues, Thurs, Sat)  Continue close follow-up nephrology    Screening  for breast cancer  -     Mammo Digital Screening Bilateral With CAD; Future    Encounter for screening mammogram for malignant neoplasm of breast   -     Mammo Digital Screening Bilateral With CAD; Future    Obesity (BMI 30.0-34.9)  Patient readiness: acceptance and barriers:none    During the course of the visit the patient was educated and counseled about the following:     Hypertension:   Medication: no change.  Obesity:   Regular aerobic exercise program discussed.    Goals: Hypertension: Reduce Blood Pressure and Obesity: Reduce calorie intake and BMI    Did patient meet goals/outcomes: No    The following self management tools provided: declined    Patient Instructions (the written plan) was given to the patient/family.     Time spent with patient: 45 minutes

## 2017-05-15 ENCOUNTER — TELEPHONE (OUTPATIENT)
Dept: ORTHOPEDICS | Facility: CLINIC | Age: 67
End: 2017-05-15

## 2017-05-15 NOTE — TELEPHONE ENCOUNTER
----- Message from Julitajanet Lopez sent at 5/15/2017  1:23 PM CDT -----  Contact: patient daughter Tamera  The patient daughter would like her mother to have an appointment to see Dr. Huerta as soon as possible.  Her daughter is stating that her mother had a double leg amputation and according to her mothers  home health nurse one of her incisions is running a foul smelling fluid.  Please call her daughter Tamera at [phone].

## 2017-05-19 ENCOUNTER — OFFICE VISIT (OUTPATIENT)
Dept: ORTHOPEDICS | Facility: CLINIC | Age: 67
End: 2017-05-19
Payer: MEDICARE

## 2017-05-19 VITALS
SYSTOLIC BLOOD PRESSURE: 87 MMHG | HEIGHT: 60 IN | HEART RATE: 80 BPM | WEIGHT: 175 LBS | BODY MASS INDEX: 34.36 KG/M2 | DIASTOLIC BLOOD PRESSURE: 52 MMHG

## 2017-05-19 DIAGNOSIS — S78.111A ABOVE KNEE AMPUTATION OF RIGHT LOWER EXTREMITY: ICD-10-CM

## 2017-05-19 PROCEDURE — 99213 OFFICE O/P EST LOW 20 MIN: CPT | Mod: 57,,, | Performed by: ORTHOPAEDIC SURGERY

## 2017-05-19 NOTE — MR AVS SNAPSHOT
[facility]  [address]  [address]  [place] [address]  Phone: [phone]  Fax: [phone]                  Aria Mcnulty   2017 10:00 AM   Office Visit    Description:  Female : 1950   Provider:  Jung Huerta MD   [facility]  [facility]           Reason for Visit     Post-op Evaluation           Diagnoses this Visit        Comments    Above knee amputation of right lower extremity                To Do List           Goals (5 Years of Data)     None      Follow-Up and Disposition     Return patient should be seen in the emergency room in 2 days.    Follow-up and Disposition History           Medications           Message regarding Medications     Verify the changes and/or additions to your medication regime listed below are the same as discussed with your clinician today.  If any of these changes or additions are incorrect, please notify your healthcare provider.             Verify that the below list of medications is an accurate representation of the medications you are currently taking.  If none reported, the list may be blank. If incorrect, please contact your healthcare provider. Carry this list with you in case of emergency.           Current Medications     acetaminophen-codeine 300-60mg (TYLENOL #4) 300-60 mg Tab Take 1 tablet by mouth 4 (four) times daily as needed.    calcium acetate (PHOSLO) 667 mg capsule Take 1,334 mg by mouth 4 (four) times daily.     clopidogrel (PLAVIX) 75 mg tablet Take 1 tablet (75 mg total) by mouth once daily.    collagenase (SANTYL) ointment Apply topical    lidocaine-prilocaine (EMLA) cream Apply topically as needed.     metolazone (ZAROXOLYN) 5 MG tablet Take 5 mg by mouth every Monday and Friday. Every Monday and Friday only    sevelamer carbonate (RENVELA) 800 mg Tab Take 800 mg by mouth 3 (three) times daily with meals.    tramadol (ULTRAM) 50 mg tablet Take 1 tablet (50 mg total) by  mouth 2 (two) times daily.    furosemide (LASIX) 40 MG tablet Take 40 mg by mouth once daily.     polyethylene glycol (GLYCOLAX) 17 gram PwPk TK 1 PACKET PO QD PRN AS DIRECTED    promethazine (PHENERGAN) 25 MG tablet 1 EVERY 4 HOURS    silver sulfADIAZINE 1% (SILVADENE) 1 % cream Apply topically 2 (two) times daily.           Clinical Reference Information           Your Vitals Were     BP Pulse Height Weight BMI    87/52 (BP Location: Left arm, Patient Position: Sitting) 80 5' (1.524 m) 79.4 kg (175 lb) 34.18 kg/m2      Blood Pressure          Most Recent Value    BP  (!)  87/52      Allergies as of 5/19/2017     Cat/feline Products    Pollen Extracts    Adhesive      Immunizations Administered on Date of Encounter - 5/19/2017     None      Maintenance Dialysis History     Start End Type Comments Center    11/11/2014  [facility]  [facility]            Current Dialysis Center Information     [facility] [address] Phone #:  [phone]    Contact:  N/A ALAINA PRIETO  [address] Fax #:  [phone]

## 2017-05-19 NOTE — PROGRESS NOTES
Subjective:     Chief Complaint  Chief Complaint   Patient presents with    Post-op Evaluation     DOS: 3/28/17, right above knee amputation.       HPI  Aria Mcnulty is a 66 y.o.  Female who is status post right above-knee amputation the patient presents with drainage from the right AKA stump which is not resolving. Plan is to cover her wounds today and have her follow-up in the emergency room for future admission she will need a debridement and infectious disease consultation      Review of Systems     Constitutional: Negative    HENT: Negative  Eyes: Negative  Respiratory: Negative  Cardiovascular: Negative  Musculoskeletal: HPI  Skin: Negative  Neurological: Negative  Hematological: Negative  Endocrine: Negative      Physical Exam    Vitals:    05/19/17 1059   BP: (!) 87/52   Pulse: 80     Physical Examination:     General appearance -  well appearing, and in no distress  Mental status - awake  Neck - supple  Chest -  symmetric air entry  Heart - normal rate   Abdomen - soft    Past Medical History  Past Medical History:   Diagnosis Date    A-V fistula     RIGHT upper arm    Charcot foot due to diabetes mellitus     CHF (congestive heart failure)     COPD (chronic obstructive pulmonary disease)     Diabetes mellitus     Diabetes mellitus type II     Dialysis patient started Nov 2014    Tues, Thurs, Sat    Encounter for blood transfusion     Hyperlipidemia     Hypertension     Osteomyelitis     RIGHT FOOT    Renal disorder     Sleep apnea     Thyroid disease     hypo       Past Surgical History  Past Surgical History:   Procedure Laterality Date    AMPUTATION OF REPLICATED TOES  12/24/2016    Dr [facility]    APPENDECTOMY      AV FISTULA PLACEMENT Right 7/22/14    FINGER AMPUTATION Left     index finger--first digit    FINGER AMPUTATION Right     index finger amp--first digit then all of finger amputated     FINGER AMPUTATION Right 8/2015    FOOT SURGERY      x 5    LEG AMPUTATION THROUGH  LOWER TIBIA AND FIBULA      Left    LEG SURGERY      on R x 4    VASCULAR SURGERY Right 7/2014    AV Fistula       Medications  Current Outpatient Prescriptions   Medication Sig    acetaminophen-codeine 300-60mg (TYLENOL #4) 300-60 mg Tab Take 1 tablet by mouth 4 (four) times daily as needed.    calcium acetate (PHOSLO) 667 mg capsule Take 1,334 mg by mouth 4 (four) times daily.     clopidogrel (PLAVIX) 75 mg tablet Take 1 tablet (75 mg total) by mouth once daily. (Patient taking differently: Take 75 mg by mouth once daily. HOLD for surgery)    collagenase (SANTYL) ointment Apply topical (Patient taking differently: daily as needed. Apply topical)    lidocaine-prilocaine (EMLA) cream Apply topically as needed.     metolazone (ZAROXOLYN) 5 MG tablet Take 5 mg by mouth every Monday and Friday. Every Monday and Friday only    sevelamer carbonate (RENVELA) 800 mg Tab Take 800 mg by mouth 3 (three) times daily with meals.    tramadol (ULTRAM) 50 mg tablet Take 1 tablet (50 mg total) by mouth 2 (two) times daily.    furosemide (LASIX) 40 MG tablet Take 40 mg by mouth once daily.     polyethylene glycol (GLYCOLAX) 17 gram PwPk TK 1 PACKET PO QD PRN AS DIRECTED    promethazine (PHENERGAN) 25 MG tablet 1 EVERY 4 HOURS    silver sulfADIAZINE 1% (SILVADENE) 1 % cream Apply topically 2 (two) times daily.     No current facility-administered medications for this visit.        Allergies  Review of patient's allergies indicates:   Allergen Reactions    Cat/feline products Shortness Of Breath     Trouble breathing    Pollen extracts Other (See Comments)     Nasal stuffiness    Adhesive      Medical tape/ Tegaderm causes skin irritation after several days           Assessment/Plan   Above knee amputation of right lower extremity

## 2017-06-01 ENCOUNTER — DOCUMENTATION ONLY (OUTPATIENT)
Dept: FAMILY MEDICINE | Facility: CLINIC | Age: 67
End: 2017-06-01

## 2017-06-01 ENCOUNTER — TELEPHONE (OUTPATIENT)
Dept: FAMILY MEDICINE | Facility: CLINIC | Age: 67
End: 2017-06-01

## 2017-06-01 NOTE — PROGRESS NOTES
Pre-Visit Chart Review  For Appointment Scheduled on 6-7-17    Health Maintenance Due   Topic Date Due    Eye Exam  07/19/1960    TETANUS VACCINE  07/19/1967    Colonoscopy  07/19/1999    Zoster Vaccine  07/19/2009    Mammogram  11/20/2015    Hemoglobin A1c  05/28/2017

## 2017-06-05 ENCOUNTER — DOCUMENTATION ONLY (OUTPATIENT)
Dept: FAMILY MEDICINE | Facility: CLINIC | Age: 67
End: 2017-06-05

## 2017-06-05 NOTE — PROGRESS NOTES
Pre-Visit Chart Review  For Appointment Scheduled on 6-6-17    Health Maintenance Due   Topic Date Due    Eye Exam  07[birth year]19/1960    TETANUS VACCINE  07/19/1967    Colonoscopy  07/19/1999    Zoster Vaccine  07/19/2009    Mammogram  11/20/2015    Hemoglobin A1c  05/28/2017

## 2017-06-06 ENCOUNTER — OFFICE VISIT (OUTPATIENT)
Dept: FAMILY MEDICINE | Facility: CLINIC | Age: 67
End: 2017-06-06
Payer: MEDICARE

## 2017-06-06 VITALS
WEIGHT: 176.81 LBS | RESPIRATION RATE: 20 BRPM | BODY MASS INDEX: 34.71 KG/M2 | SYSTOLIC BLOOD PRESSURE: 100 MMHG | DIASTOLIC BLOOD PRESSURE: 60 MMHG | HEIGHT: 60 IN | TEMPERATURE: 98 F

## 2017-06-06 DIAGNOSIS — I82.812 SUPERFICIAL THROMBOSIS OF LEFT LOWER EXTREMITY: ICD-10-CM

## 2017-06-06 DIAGNOSIS — Z89.611 S/P AKA (ABOVE KNEE AMPUTATION) UNILATERAL, RIGHT: ICD-10-CM

## 2017-06-06 DIAGNOSIS — Z89.512 S/P BKA (BELOW KNEE AMPUTATION) UNILATERAL, LEFT: ICD-10-CM

## 2017-06-06 DIAGNOSIS — I73.9 PERIPHERAL VASCULAR DISEASE: ICD-10-CM

## 2017-06-06 DIAGNOSIS — E11.8 CONTROLLED DIABETES MELLITUS TYPE 2 WITH COMPLICATIONS, UNSPECIFIED LONG TERM INSULIN USE STATUS: Primary | ICD-10-CM

## 2017-06-06 DIAGNOSIS — G89.29 OTHER CHRONIC PAIN: ICD-10-CM

## 2017-06-06 DIAGNOSIS — I10 GOOD HYPERTENSION CONTROL: ICD-10-CM

## 2017-06-06 PROCEDURE — 99999 PR PBB SHADOW E&M-EST. PATIENT-LVL III: CPT | Mod: PBBFAC,,, | Performed by: FAMILY MEDICINE

## 2017-06-06 PROCEDURE — 99213 OFFICE O/P EST LOW 20 MIN: CPT | Mod: PBBFAC,PO | Performed by: FAMILY MEDICINE

## 2017-06-06 PROCEDURE — 99214 OFFICE O/P EST MOD 30 MIN: CPT | Mod: S$PBB,,, | Performed by: FAMILY MEDICINE

## 2017-06-06 RX ORDER — FUROSEMIDE 40 MG/1
40 TABLET ORAL DAILY
Qty: 30 TABLET | Refills: 3 | Status: SHIPPED | OUTPATIENT
Start: 2017-06-06

## 2017-06-06 RX ORDER — OXYCODONE HYDROCHLORIDE 10 MG/1
TABLET ORAL
COMMUNITY
Start: 2017-05-30

## 2017-06-06 RX ORDER — VANCOMYCIN HYDROCHLORIDE 1 G/20ML
INJECTION, POWDER, LYOPHILIZED, FOR SOLUTION INTRAVENOUS
COMMUNITY
Start: 2017-06-02

## 2017-06-06 RX ORDER — ASPIRIN 81 MG/1
TABLET ORAL
Refills: 0 | COMMUNITY
Start: 2017-05-30

## 2017-06-06 RX ORDER — ACETAMINOPHEN AND CODEINE PHOSPHATE 300; 60 MG/1; MG/1
1 TABLET ORAL 4 TIMES DAILY PRN
Qty: 120 TABLET | Refills: 0 | Status: SHIPPED | OUTPATIENT
Start: 2017-06-06

## 2017-06-06 NOTE — PATIENT INSTRUCTIONS
Weight Management: Getting Started  Healthy bodies come in all shapes and sizes. Not all bodies are made to be thin. For some people, a healthy weight is higher than the average weight listed on weight charts. Your healthcare provider can help you decide on a healthy weight for you.    Reasons to lose weight  Losing weight can help with some health problems, such as high blood pressure, heart disease, diabetes, sleep apnea, and arthritis. You may also feel more energy.  Set your long-term goal  Your goal doesn't even have to be a specific weight. You may decide on a fitness goal (such as being able to walk 10 miles a week), or a health goal (such as lowering your blood pressure). Choose a goal that is measurable and reasonable, so you know when you've reached it. A goal of reaching a BMI of less than 25 is not always reasonable (or possible).   Make an action plan  Habits dont change overnight. Setting your goals too high can leave you feeling discouraged if you cant reach them. Be realistic. Choose one or two small changes you can make now. Set an action plan for how you are going to make these changes. When you can stick to this plan, keep making a few more small changes. Taking small steps will help you stay on the path to success.  Track your progress  Write down your goals. Then, keep a daily record of your progress. Write down what you eat and how active you are. This record lets you look back on how much youve done. It may also help when youre feeling frustrated. Reward yourself for success. Even if you dont reach every goal, give yourself credit for what you do get done.  Get support  Encouragement from others can help make losing weight easier. Ask your family members and friends for support. They may even want to join you. Also look to your healthcare provider, registered dietitian, and  for help. Your local hospital can give you more information about nutrition, exercise, and  weight loss.  Date Last Reviewed: 1/31/2016 © 2000-2016 [employer], [employer]. [address] [place] [address], [place] [address]. All rights reserved. This information is not intended as a substitute for professional medical care. Always follow your healthcare professional's instructions.        Diabetes (General Information)  Diabetes is a long-term health problem. It means your body does not make enough insulin. Or it may mean that your body cannot use the insulin it makes. Insulin is a hormone in your body. It lets blood sugar (glucose) reach the cells in your body. All of your cells need glucose for fuel.  When you have diabetes, the glucose in your blood builds up because it cannot get into the cells. This buildup is called high blood sugar (hyperglycemia).  Your blood sugar level depends on several things. It depends on what kind of food you eat and how much of it you eat. It also depends on how much exercise you get, and how much insulin you have in your body. Eating too much of the wrong kinds of food or not taking diabetes medicine on time can cause high blood sugar. Infections can cause high blood sugar even if you are taking medicines correctly.  These things can also cause low blood sugar:  · Missing meals  · Not eating enough food  · Taking too much diabetes medicine  Diabetes can cause serious problems over time if you do not get treated. These problems include heart disease, stroke, kidney failure, and blindness. They also include nerve pain or loss of feeling in your legs and feet, and gangrene of the feet. By keeping your blood sugar under control you can prevent or delay these problems.  Normal blood sugar levels are 80 to 100 before a meal and less than 180 in the 1 to 2 hours after a meal.  Home care  Follow these guidelines when caring for yourself at home:  · Follow the diet your healthcare provider gives you. Take insulin or other diabetes medicine exactly as told to.  · Watch your blood sugar as  you are told to. Keep a log of your results. This will help your provider change your medicines to keep your blood sugar under control.  · Try to reach your ideal weight. You may be able to cut back on or not have to take diabetes medicine if you eat the right foods and get exercise.  · Do not smoke. Smoking worsens the effects of diabetes on your circulation. You are much more likely to have a heart attack if you have diabetes and you smoke.  · Take good care of your feet. If you have lost feeling in your feet, you may not see an injury or infection. Check your feet and between your toes at least once a week.  · Wear a medical alert bracelet or necklace, or carry a card in your wallet that says you have diabetes. This will help healthcare providers give you the right care if you get very ill and cannot tell them that you have diabetes.  Sick day plan  If you get a cold, the flu, or a bacterial or viral infection, take these steps:  · Look at your diabetes sick plan and call your healthcare provider as you were told to. You may need to call your provider right away if:  ¨ Your blood sugar is above 240 while taking your diabetes medicine  ¨ Your urine ketone levels are above normal or high  ¨ You have been vomiting more than 6 hours  ¨ You have trouble breathing or your breath ha s a fruity smell  ¨ You have a high fever  ¨ You have a fever for several days and you are not getting better  ¨ You get light-headed and are sleepier than usual  · Keep taking your diabetes pills (oral medicine) even if you have been vomiting and are feeling sick. Call your provider right away because you may need insulin to lower your blood sugar until you recover from your illness.  · Keep taking your insulin even if you have been vomiting and are feeling sick. Call your provider right away to ask if you need to change your insulin dose. This will depend on your blood sugar results.  · Check your blood sugar every 2 to 4 hours, or at  least 4 times a day.  · Check your ketones often. If you are vomiting and having diarrhea, watch them more often.  · Do not skip meals. Try to eat small meals on a regular schedule. Do this even if you do not feel like eating.  · Drink water or other liquids that do not have caffeine or calories. This will keep you from getting dehydrated. If you are nauseated or vomiting, takes small sips every 5 minutes. To prevent dehydration try to drink a cup (8 ounces) of fluids every hour while you are awake.  General care  Always bring a source of fast-acting sugar with you in case you have symptoms of low blood sugar (below 70). At the first sign of low blood sugar, eat or drink 15 to 20 grams of fast-acting sugar to raise your blood sugar. Examples are:  · 3 to 4 glucose tablets. You can buy these at most [employer].  · 4 ounces (1/2 cup) of regular (not diet) soft drinks  · 4 ounces (1/2 cup) of any fruit juice  · 8 ounces (1 cup) of milk  · 5 to 6 pieces of hard candy  · 1 tablespoon of honey  Check your blood sugar 15 minutes after treating yourself. If it is still below 70, take 15 to 20 more grams of fast-acting sugar. Test again in 15 minutes. If it returns to normal (70 or above), eat a snack or meal to keep your blood sugar in a safe range. If it stays low, call your doctor or go to an emergency room.  Follow-up care  Follow-up with your healthcare provider, or as advised. For more information about diabetes, visit the American Diabetes Association website at www.diabetes.org or call 424-375-1912.  When to seek medical advice  Call your healthcare provider right away if you have any of these symptoms of high blood sugar:  · Frequent urination  · Dizziness  · Drowsiness  · Thirst  · Headache  · Nausea or vomiting  · Abdominal pain  · Eyesight changes  · Fast breathing  · Confusion or loss of consciousness  Also call your provider right away if you have any of these signs of low blood  sugar:  · Fatigue  · Headache  · Shakes  · Excess sweating  · Hunger  · Feeling anxious or restless  · Eyesight changes  · Drowsiness  · Weakness  · Confusion or loss of consciousness  Call 911  Call for emergency help right away if any of these occur:  · Chest pain or shortness of breath  · Dizziness or fainting  · Weakness of an arm or leg or one side of the face  · Trouble speaking or seeing   Date Last Reviewed: 6/1/2016  © 1224-2444 [employer]. [address] [place] [address]. All rights reserved. This information is not intended as a substitute for professional medical care. Always follow your healthcare professional's instructions.

## 2017-06-06 NOTE — PROGRESS NOTES
Subjective:       Patient ID: Aria Mcnulty is a 66 y.o. female.    Chief Complaint: Hospital Follow Up    66 year old female with diabetes and hypertensive, end stage renal disease on dialysis at [facility], neuropathy, chf, copd, sleep apnea, hypothyroidism, and multiple amputations including fingers and toes and advancing to a recent right aka and left bka.  She was hospitalized at [facility] May 21-30 when she presented to the ER with drainage from her right AKA stump and pain in the left BKA stump.  She was found to have osteomyelitis with a drainage tract on the right and superficial infection on the left.  Cultures grew methicillin SENSITIVE staph aures.  She had revision of the stumps by Dr. Block with application of wound vac and I antibiotics.  On discharge she declined to go to LTAC as requested and went home to home health care and continues receiving care at [facility].  She was given oxycodone for pain but finds it does not work for her and prefers the tylenol 4 as being more effective.  She has a new knot in the left anteromedial proximal thigh that arose in the last three days.  It is warm and tender to touch.  She is on aspirin 81mg once a day.  She has no fever, chills, or night sweats.    Past Medical History:  No date: A-V fistula      Comment: RIGHT upper arm  No date: Charcot foot due to diabetes mellitus  No date: CHF (congestive heart failure)  No date: COPD (chronic obstructive pulmonary disease)  No date: Diabetes mellitus  No date: Diabetes mellitus type II  started Nov 2014: Dialysis patient      Comment: Tues, Thurs, Sat  No date: Encounter for blood transfusion  No date: Hyperlipidemia  No date: Hypertension  No date: Osteomyelitis      Comment: RIGHT FOOT  No date: Renal disorder  No date: Sleep apnea  No date: Thyroid disease      Comment: hypo    Past Surgical History:  12/24/2016: AMPUTATION OF REPLICATED TOES      Comment: Dr Linares [facility]  No date: APPENDECTOMY  7/22/14: AV FISTULA PLACEMENT  Right  05/2017: FEMUR DEBRIDEMENT      Comment: [facility]  No date: FINGER AMPUTATION Left      Comment: index finger--first digit  No date: FINGER AMPUTATION Right      Comment: index finger amp--first digit then all of                finger amputated   8/2015: FINGER AMPUTATION Right  No date: FOOT SURGERY      Comment: x 5  No date: LEG AMPUTATION THROUGH LOWER TIBIA AND FIBULA      Comment: Left  No date: LEG SURGERY      Comment: on R x 4  7/2014: VASCULAR SURGERY Right      Comment: AV Fistula      Current Outpatient Prescriptions:     acetaminophen-codeine 300-60mg (TYLENOL #4) 300-60 mg Tab, Take 1 tablet by mouth 4 (four) times daily as needed., Disp: 120 tablet, Rfl: 0    aspirin (ECOTRIN) 81 MG EC tablet, TK 1 T PO QD, Disp: , Rfl: 0    calcium acetate (PHOSLO) 667 mg capsule, Take 1,334 mg by mouth 4 (four) times daily. , Disp: , Rfl:     collagenase (SANTYL) ointment, Apply topical (Patient taking differently: daily as needed. Apply topical), Disp: 30 g, Rfl: 2    furosemide (LASIX) 40 MG tablet, Take 1 tablet (40 mg total) by mouth once daily., Disp: 30 tablet, Rfl: 3    lidocaine-prilocaine (EMLA) cream, Apply topically as needed. , Disp: , Rfl: 6    metolazone (ZAROXOLYN) 5 MG tablet, Take 5 mg by mouth every Monday and Friday. Every Monday and Friday only, Disp: , Rfl:     oxycodone (ROXICODONE) 10 mg Tab immediate release tablet, , Disp: , Rfl:     promethazine (PHENERGAN) 25 MG tablet, 1 EVERY 4 HOURS (Patient taking differently: 1 EVERY 4 HOURS prn), Disp: 20 tablet, Rfl: 0    SENNOSIDES/DOCUSATE SODIUM (SENOKOT-S ORAL), Take 1 tablet by mouth 2 (two) times daily., Disp: , Rfl:     sevelamer carbonate (RENVELA) 800 mg Tab, Take 800 mg by mouth 3 (three) times daily with meals., Disp: , Rfl:     silver sulfADIAZINE 1% (SILVADENE) 1 % cream, Apply topically 2 (two) times daily. (Patient taking differently: Apply topically 2 (two) times daily as needed. ), Disp: 25 g, Rfl: 2    vancomycin  (VANCOCIN) 1,000 mg injection, , Disp: , Rfl:     clopidogrel (PLAVIX) 75 mg tablet, Take 1 tablet (75 mg total) by mouth once daily. (Patient taking differently: Take 75 mg by mouth once daily. HOLD for surgery), Disp: 30 tablet, Rfl: 11    tramadol (ULTRAM) 50 mg tablet, Take 1 tablet (50 mg total) by mouth 2 (two) times daily. (Patient taking differently: Take 50 mg by mouth 2 (two) times daily as needed. ), Disp: 60 tablet, Rfl: 0    Eye Exam due on 07/19/1959  TETANUS VACCINE due on 07/19/1967  Colonoscopy due on 07/19/1999  Zoster Vaccine due on 07/19/2009  Mammogram due on 11/20/2015  Hemoglobin A1c due on 05/28/2017-We will do this at [facility], they draw blood on tuesdays for lab.        Review of Systems   Constitutional: Negative for chills, diaphoresis and fever.   Respiratory: Negative for chest tightness and shortness of breath.    Cardiovascular: Negative for chest pain and palpitations.        Suspected superficial thrombosis left proximal thigh as above   Gastrointestinal: Negative for constipation, diarrhea, nausea and vomiting.   Musculoskeletal: Negative for arthralgias.   Skin: Positive for wound.       Objective:      Physical Exam   Constitutional: She is oriented to person, place, and time. She appears well-developed. No distress.   Good blood pressure control  Patient is obese with bmi of 34.5.   Weight is decreased by 6.7lb since last visit of 3/8/17.     Cardiovascular: Normal rate and regular rhythm.    Pulmonary/Chest: Effort normal and breath sounds normal.   Feet:   Right Foot: amputated  Left Foot: amputated  Neurological: She is alert and oriented to person, place, and time.   Skin: She is not diaphoretic.        Psychiatric: She has a normal mood and affect. Her behavior is normal. Judgment and thought content normal.   Nursing note and vitals reviewed.      Assessment:       1. Controlled diabetes mellitus type 2 with complications, unspecified long term insulin use status    2.  Good hypertension control    3. Peripheral vascular disease    4. Superficial thrombosis of left lower extremity    5. Other chronic pain    6. S/P AKA (above knee amputation) unilateral, right    7. S/P BKA (below knee amputation) unilateral, left    8. BMI 34.0-34.9,adult        Plan:       1. Controlled diabetes mellitus type 2 with complications, unspecified long term insulin use status  Requested [facility] do and a1c with lab next Tuesday    2. Good hypertension control  No changes needed, tends to hypotension after dialysis and had it earlier today    3. Peripheral vascular disease  Multiple amputations    4. Superficial thrombosis of left lower extremity  Try to confirm with ultrasound, increase aspirin if confirmed, warm compresses/heating pad  - US Soft Tissue Misc; Future    5. Other chronic pain  - acetaminophen-codeine 300-60mg (TYLENOL #4) 300-60 mg Tab; Take 1 tablet by mouth 4 (four) times daily as needed.  Dispense: 120 tablet; Refill: 0    6. S/P AKA (above knee amputation) unilateral, right    7. S/P BKA (below knee amputation) unilateral, left    8. BMI 34.0-34.9,adult           Patient readiness: acceptance and barriers:none    During the course of the visit the patient was educated and counseled about the following:     Diabetes:  Discussed general issues about diabetes pathophysiology and management.  Hypertension:   Medication: no change.  Dietary sodium restriction.  Regular aerobic exercise.  Obesity:   General weight loss/lifestyle modification strategies discussed (elicit support from others; identify saboteurs; non-food rewards, etc).    Goals: Diabetes: Maintain Hemoglobin A1C below 7, Hypertension: Reduce Blood Pressure and Obesity: Reduce calorie intake and BMI    Did patient meet goals/outcomes: Yes    The following self management tools provided: blood pressure log  blood glucose log    Patient Instructions (the written plan) was given to the patient/family.     Time spent with patient:  30 minutes

## 2017-06-07 ENCOUNTER — HOSPITAL ENCOUNTER (OUTPATIENT)
Dept: RADIOLOGY | Facility: HOSPITAL | Age: 67
Discharge: HOME | End: 2017-06-07
Attending: FAMILY MEDICINE
Payer: MEDICARE

## 2017-06-07 DIAGNOSIS — R22.42 LUMP OF SKIN OF LEFT LOWER EXTREMITY: ICD-10-CM

## 2017-06-07 PROCEDURE — 93971 EXTREMITY STUDY: CPT | Mod: TC

## 2017-06-07 PROCEDURE — 93971 EXTREMITY STUDY: CPT | Mod: 26,,, | Performed by: RADIOLOGY

## 2017-06-09 ENCOUNTER — TELEPHONE (OUTPATIENT)
Dept: ORTHOPEDICS | Facility: CLINIC | Age: 67
End: 2017-06-09

## 2017-06-09 ENCOUNTER — OFFICE VISIT (OUTPATIENT)
Dept: ORTHOPEDICS | Facility: CLINIC | Age: 67
End: 2017-06-09
Payer: MEDICARE

## 2017-06-09 VITALS
HEART RATE: 105 BPM | DIASTOLIC BLOOD PRESSURE: 63 MMHG | WEIGHT: 176 LBS | BODY MASS INDEX: 34.55 KG/M2 | HEIGHT: 60 IN | SYSTOLIC BLOOD PRESSURE: 107 MMHG

## 2017-06-09 DIAGNOSIS — T87.89 ISCHEMIA OF LEFT BKA SITE: ICD-10-CM

## 2017-06-09 DIAGNOSIS — I99.8 ISCHEMIA OF LEFT BKA SITE: ICD-10-CM

## 2017-06-09 DIAGNOSIS — Z89.611 HX OF AKA (ABOVE KNEE AMPUTATION), RIGHT: Primary | ICD-10-CM

## 2017-06-09 PROCEDURE — 99024 POSTOP FOLLOW-UP VISIT: CPT | Mod: ,,, | Performed by: ORTHOPAEDIC SURGERY

## 2017-06-09 NOTE — PROGRESS NOTES
Past Medical History:   Diagnosis Date    A-V fistula     RIGHT upper arm    Charcot foot due to diabetes mellitus     CHF (congestive heart failure)     COPD (chronic obstructive pulmonary disease)     Diabetes mellitus     Diabetes mellitus type II     Dialysis patient started Nov 2014    Tues, Thurs, Sat    Encounter for blood transfusion     Hyperlipidemia     Hypertension     Osteomyelitis     RIGHT FOOT    Renal disorder     Sleep apnea     Thyroid disease     hypo       Past Surgical History:   Procedure Laterality Date    AMPUTATION OF REPLICATED TOES  12/24/2016    Dr Linares [facility]    APPENDECTOMY      AV FISTULA PLACEMENT Right 7/22/14    FEMUR DEBRIDEMENT  05/2017    [facility]    FINGER AMPUTATION Left     index finger--first digit    FINGER AMPUTATION Right     index finger amp--first digit then all of finger amputated     FINGER AMPUTATION Right 8/2015    FOOT SURGERY      x 5    LEG AMPUTATION THROUGH LOWER TIBIA AND FIBULA      Left    LEG SURGERY      on R x 4    VASCULAR SURGERY Right 7/2014    AV Fistula       Current Outpatient Prescriptions   Medication Sig    acetaminophen-codeine 300-60mg (TYLENOL #4) 300-60 mg Tab Take 1 tablet by mouth 4 (four) times daily as needed.    aspirin (ECOTRIN) 81 MG EC tablet TK 1 T PO QD    calcium acetate (PHOSLO) 667 mg capsule Take 1,334 mg by mouth 4 (four) times daily.     clopidogrel (PLAVIX) 75 mg tablet Take 1 tablet (75 mg total) by mouth once daily. (Patient taking differently: Take 75 mg by mouth once daily. HOLD for surgery)    collagenase (SANTYL) ointment Apply topical (Patient taking differently: daily as needed. Apply topical)    furosemide (LASIX) 40 MG tablet Take 1 tablet (40 mg total) by mouth once daily.    lidocaine-prilocaine (EMLA) cream Apply topically as needed.     metolazone (ZAROXOLYN) 5 MG tablet Take 5 mg by mouth every Monday and Friday. Every Monday and Friday only    oxycodone (ROXICODONE) 10 mg Tab  immediate release tablet     promethazine (PHENERGAN) 25 MG tablet 1 EVERY 4 HOURS (Patient taking differently: 1 EVERY 4 HOURS prn)    SENNOSIDES/DOCUSATE SODIUM (SENOKOT-S ORAL) Take 1 tablet by mouth 2 (two) times daily.    sevelamer carbonate (RENVELA) 800 mg Tab Take 800 mg by mouth 3 (three) times daily with meals.    silver sulfADIAZINE 1% (SILVADENE) 1 % cream Apply topically 2 (two) times daily. (Patient taking differently: Apply topically 2 (two) times daily as needed. )    tramadol (ULTRAM) 50 mg tablet Take 1 tablet (50 mg total) by mouth 2 (two) times daily. (Patient taking differently: Take 50 mg by mouth 2 (two) times daily as needed. )    vancomycin (VANCOCIN) 1,000 mg injection      No current facility-administered medications for this visit.        Review of patient's allergies indicates:   Allergen Reactions    Cat/feline products Shortness Of Breath     Trouble breathing    Pollen extracts Other (See Comments)     Nasal stuffiness    Adhesive      Medical tape/ Tegaderm causes skin irritation after several days       Family History   Problem Relation Age of Onset    Heart disease Mother     Heart disease Father     Kidney disease Father     Stroke Father     Heart disease Sister     Diabetes Brother     Lupus Daughter     Liver disease Son     Hypertension Son     No Known Problems Maternal Aunt     Heart disease Maternal Uncle     No Known Problems Paternal Aunt     Heart disease Paternal Uncle     No Known Problems Maternal Grandmother     Heart disease Maternal Grandfather     Dementia Paternal Grandmother     Early death Paternal Grandfather     Tuberculosis Paternal Grandfather     No Known Problems Paternal Aunt        Social History     Social History    Marital status:      Spouse name: N/A    Number of children: N/A    Years of education: N/A     Occupational History    Not on file.     Social History Main Topics    Smoking status: Former Smoker      Quit date: 6/5/1985    Smokeless tobacco: Never Used    Alcohol use No      Comment: occasional    Drug use: No    Sexual activity: Not Currently     Other Topics Concern    Not on file     Social History Narrative    No narrative on file       Chief Complaint:   Chief Complaint   Patient presents with    Left Knee - Post-op Evaluation     She for her left knee check up. It is still painful.       Consulting Physician: No ref. provider found    History of Present Illness:    This is a 66 y.o. year old female who complains of Patient had a previous right above-knee amputation she's presently in a wound VAC the patient has a BKA on the left side which has ischemic changes is some superficial necrosis she's presently on local wound care      ROS    Examination:    Vital Signs:    Vitals:    06/09/17 1037   BP: 107/63   Pulse: 105   Weight: 79.8 kg (176 lb)   Height: 5' (1.524 m)   PainSc:   7       This a well-developed, well nourished patient in no acute distress.    Alert and oriented and cooperative to examination.       Physical Exam: Left Knee Exam    Gait   Normal    Skin  Rash:   None  Scars:   None    Inspection  Erythema:  Patient has some mild erythema and some evidence of superficial skin ischemic changes on the left BKA stump she also has some superficial ulceration of the distal femoral area immediately she does not appear to have any abscess formation  Bruising:  None  Effusion:  None  Masses:  None  Lymphadenopathy: None    Range of Motion: 0 to 130°    Medial Joint : None  Lateral Joint : None    Patellofemoral Tenderness: None  Patellofemoral Crepitus: None    Lachman:  Normal  Anterior Drawer: Normal  Posterior Drawer: Normal    Braden's:  Negative  Apley's:  Negative    Varus Stress:  Stable  Valgus Stress:  Stable    Strength:  5/5    Pulses:  Palpable distal    Sensation:  Intact          Imaging: X-rays ordered and reviewed today none done today     Assessment:  atient's presently using a wound VAC to the right AKA and appears to be doing well patient has a left below-knee amputation with superficial erythema and cellulitis she is presently on IV antibiotics    Plan:  Patient should continue wound VAC and local wound care and follow-up in 2 weeks      DISCLAIMER: This note may have been dictated using voice recognition software and may contain grammatical errors.     NOTE: Consult report sent to referring provider via [facility] [employer].

## 2017-06-09 NOTE — TELEPHONE ENCOUNTER
----- Message from Julita Lopez sent at 6/9/2017  8:48 AM CDT -----  Contact: Patient caregiver  This patient is scheduled for an appointment to see Dr. Huerta on Wednesday June 21st.  The patient is requesting a sooner appointment to see the physician due to an increasing wound problem with her left stump.  The patient can be contacted at [phone].

## 2017-06-19 ENCOUNTER — TELEPHONE (OUTPATIENT)
Dept: FAMILY MEDICINE | Facility: CLINIC | Age: 67
End: 2017-06-19

## 2017-07-05 ENCOUNTER — OFFICE VISIT (OUTPATIENT)
Dept: ORTHOPEDICS | Facility: CLINIC | Age: 67
End: 2017-07-05
Payer: MEDICARE

## 2017-07-05 VITALS
WEIGHT: 176 LBS | BODY MASS INDEX: 34.55 KG/M2 | HEART RATE: 76 BPM | HEIGHT: 60 IN | SYSTOLIC BLOOD PRESSURE: 98 MMHG | DIASTOLIC BLOOD PRESSURE: 58 MMHG

## 2017-07-05 DIAGNOSIS — I99.8 ISCHEMIA OF LEFT BKA SITE: ICD-10-CM

## 2017-07-05 DIAGNOSIS — T87.89 ISCHEMIA OF LEFT BKA SITE: ICD-10-CM

## 2017-07-05 DIAGNOSIS — Z89.611 HX OF AKA (ABOVE KNEE AMPUTATION), RIGHT: Primary | ICD-10-CM

## 2017-07-05 PROCEDURE — 1159F MED LIST DOCD IN RCRD: CPT | Mod: ,,, | Performed by: ORTHOPAEDIC SURGERY

## 2017-07-05 PROCEDURE — 99213 OFFICE O/P EST LOW 20 MIN: CPT | Mod: ,,, | Performed by: ORTHOPAEDIC SURGERY

## 2017-07-05 RX ORDER — CINACALCET HYDROCHLORIDE 30 MG/1
1 TABLET, COATED ORAL DAILY
COMMUNITY
Start: 2017-06-30

## 2017-07-05 NOTE — PROGRESS NOTES
Past Medical History:   Diagnosis Date    A-V fistula     RIGHT upper arm    Charcot foot due to diabetes mellitus     CHF (congestive heart failure)     COPD (chronic obstructive pulmonary disease)     Diabetes mellitus     Diabetes mellitus type II     Dialysis patient started Nov 2014    Tues, Thurs, Sat    Encounter for blood transfusion     Hyperlipidemia     Hypertension     Osteomyelitis     RIGHT FOOT    Renal disorder     Sleep apnea     Thyroid disease     hypo       Past Surgical History:   Procedure Laterality Date    AMPUTATION OF REPLICATED TOES  12/24/2016    Dr Linares [facility]    APPENDECTOMY      AV FISTULA PLACEMENT Right 7/22/14    FEMUR DEBRIDEMENT  05/2017    [facility]    FINGER AMPUTATION Left     index finger--first digit    FINGER AMPUTATION Right     index finger amp--first digit then all of finger amputated     FINGER AMPUTATION Right 8/2015    FOOT SURGERY      x 5    LEG AMPUTATION THROUGH KNEE  03/28/2017    LEG AMPUTATION THROUGH LOWER TIBIA AND FIBULA      Left    LEG SURGERY      on R x 4    VASCULAR SURGERY Right 7/2014    AV Fistula       Current Outpatient Prescriptions   Medication Sig    acetaminophen-codeine 300-60mg (TYLENOL #4) 300-60 mg Tab Take 1 tablet by mouth 4 (four) times daily as needed.    aspirin (ECOTRIN) 81 MG EC tablet TK 1 T PO QD    calcium acetate (PHOSLO) 667 mg capsule Take 1,334 mg by mouth 4 (four) times daily.     clopidogrel (PLAVIX) 75 mg tablet Take 1 tablet (75 mg total) by mouth once daily. (Patient taking differently: Take 75 mg by mouth once daily. HOLD for surgery)    collagenase (SANTYL) ointment Apply topical (Patient taking differently: daily as needed. Apply topical)    furosemide (LASIX) 40 MG tablet Take 1 tablet (40 mg total) by mouth once daily.    lidocaine-prilocaine (EMLA) cream Apply topically as needed.     metolazone (ZAROXOLYN) 5 MG tablet Take 5 mg by mouth every Monday and Friday. Every Monday and Friday  only    oxycodone (ROXICODONE) 10 mg Tab immediate release tablet     promethazine (PHENERGAN) 25 MG tablet 1 EVERY 4 HOURS (Patient taking differently: 1 EVERY 4 HOURS prn)    SENNOSIDES/DOCUSATE SODIUM (SENOKOT-S ORAL) Take 1 tablet by mouth 2 (two) times daily.    SENSIPAR 30 mg Tab Take 1 tablet by mouth once daily.    sevelamer carbonate (RENVELA) 800 mg Tab Take 800 mg by mouth 3 (three) times daily with meals.    silver sulfADIAZINE 1% (SILVADENE) 1 % cream Apply topically 2 (two) times daily. (Patient taking differently: Apply topically 2 (two) times daily as needed. )    tramadol (ULTRAM) 50 mg tablet Take 1 tablet (50 mg total) by mouth 2 (two) times daily. (Patient taking differently: Take 50 mg by mouth 2 (two) times daily as needed. )    vancomycin (VANCOCIN) 1,000 mg injection      No current facility-administered medications for this visit.        Review of patient's allergies indicates:   Allergen Reactions    Cat/feline products Shortness Of Breath     Trouble breathing    Pollen extracts Other (See Comments)     Nasal stuffiness    Adhesive      Medical tape/ Tegaderm causes skin irritation after several days       Family History   Problem Relation Age of Onset    Heart disease Mother     Heart disease Father     Kidney disease Father     Stroke Father     Heart disease Sister     Diabetes Brother     Lupus Daughter     Liver disease Son     Hypertension Son     No Known Problems Maternal Aunt     Heart disease Maternal Uncle     No Known Problems Paternal Aunt     Heart disease Paternal Uncle     No Known Problems Maternal Grandmother     Heart disease Maternal Grandfather     Dementia Paternal Grandmother     Early death Paternal Grandfather     Tuberculosis Paternal Grandfather     No Known Problems Paternal Aunt        Social History     Social History    Marital status:      Spouse name: N/A    Number of children: N/A    Years of education: N/A      Occupational History    Not on file.     Social History Main Topics    Smoking status: Former Smoker     Quit date: 6/5/1985    Smokeless tobacco: Never Used    Alcohol use No      Comment: occasional    Drug use: No    Sexual activity: Not Currently     Other Topics Concern    Not on file     Social History Narrative    No narrative on file       Chief Complaint:   Chief Complaint   Patient presents with    Post-op Evaluation     5 weeks & 2 days for 4th surgery.  DOS: 5/29/17, exploration of wound & application of wound vac to right AKA stump I&D.  5 week & 5 days p/o for 3rd surgery.  DOS: 5/26/17, revision of right AKA w/ bone debridement biopsy & culture.  6 weeks & 1 day p/o 2nd surgery.  DOS:  5/23/17, deep debridement of right AKA stump & superficial debridement of left BKA stump.  Original DOS: 3/28/17, Right AKA.  Pateint states her right left is doing ok.  Patient has a bloot clot in her upper left leg per U/S        Consulting Physician: No ref. provider found    History of Present Illness:    This is a 66 y.o. year old female who complains of patient returns today complaining only of some left upper leg pain near her groin from a blood clot she has minimal complaints of pain in the left BKA stump and she's presently has a wound VAC on the right AKA stump which does not bother her at all      ROS    Examination:    Vital Signs:    Vitals:    07/05/17 1347   BP: (!) 98/58   Pulse: 76   Weight: 79.8 kg (176 lb)   Height: 5' (1.524 m)       This a well-developed, well nourished patient in no acute distress.    Alert and oriented and cooperative to examination.       Physical Exam: examination of her right above-knee amputation there is a wound VAC in place with some drainage the wound appears to be slowly closing  Examination of the left below-knee amputation show some erythema around some superficial necrotic areas mostly on the tip of the BKA medially and along the medial aspect of the upper  legthat have any evidence of any deep abscess formation so the patient denies any history of any fever  Imaging: X-rays ordered and reviewed today no x-rays done today     Assessment: official necrosis and cellulitis of a left below-knee amputation also a right above-knee amputation with a slowly closing sinusof the AKA stump    Plan:  The patient is present on a dialysis wound care comes her house daily of for treatment of the left BKA andright AKA stumpI recommend continued local wound care to the left and right lower legsshe is presently on IV antibioticsI think that the wound VAC on the right leg will most likely be able to beDC'd soon t this time I do not see any abscess formation in the left BKA stump she has superficial necrosis which I wish to watch and observe and see what declares itself she will probably need a future debridement of the left BKA stump      DISCLAIMER: This note may have been dictated using voice recognition software and may contain grammatical errors.     NOTE: Consult report sent to referring provider via [employer].

## 2017-07-06 DIAGNOSIS — Z89.611 HX OF AKA (ABOVE KNEE AMPUTATION), RIGHT: Primary | ICD-10-CM

## 2017-07-07 RX ORDER — ACETAMINOPHEN AND CODEINE PHOSPHATE 300; 60 MG/1; MG/1
1 TABLET ORAL
Qty: 30 TABLET | Refills: 0 | Status: SHIPPED | OUTPATIENT
Start: 2017-07-07 | End: 2017-07-17

## 2017-07-10 ENCOUNTER — NURSE TRIAGE (OUTPATIENT)
Dept: ADMINISTRATIVE | Facility: CLINIC | Age: 67
End: 2017-07-10

## 2017-07-10 NOTE — TELEPHONE ENCOUNTER
Reason for Disposition   Age > 60 years (Exception: brief heart beat symptoms that went away and now feels well)    Protocols used: ST HEART RATE AND HEARTBEAT [id number]    Charlie (Home Health Nurse) calling with concerns of pt heart rate irregular reading between 95 to 115.  Pt's wound to left side-has an ulcer at the end of the stump, swelling and blisters noted as well as painful.  No fever/temp noted per Lucio.  Care advice given.

## 2017-07-12 ENCOUNTER — TELEPHONE (OUTPATIENT)
Dept: FAMILY MEDICINE | Facility: CLINIC | Age: 67
End: 2017-07-12

## 2017-07-12 NOTE — TELEPHONE ENCOUNTER
----- Message from Louisa Bang sent at 7/12/2017  2:39 PM CDT -----  Contact: Patient  Patient called and stated that Dr. Regalado advised her to go to the ER. She wants to know if he can call them to let them know she is coming and call in orders for her.    [place] is [place].    She can be contacted at [phone] (son's cell phone Zbigniew Mcnulty - she will be with him).    Thanks,  Louisa

## 2017-07-13 ENCOUNTER — HISTORICAL (OUTPATIENT)
Dept: ADMINISTRATIVE | Facility: HOSPITAL | Age: 67
End: 2017-07-13

## 2017-07-25 LAB
ALBUMIN SERPL-MCNC: 2.7 G/DL (ref 3.1–4.7)
ALP SERPL-CCNC: 81 IU/L (ref 40–104)
ALT (SGPT): 13 IU/L (ref 3–33)
AST SERPL-CCNC: 17 IU/L (ref 10–40)
BILIRUB SERPL-MCNC: 1.7 MG/DL (ref 0.3–1)
BUN SERPL-MCNC: 51 MG/DL (ref 8–20)
CALCIUM SERPL-MCNC: 8.5 MG/DL (ref 7.7–10.4)
CHLORIDE: 96 MMOL/L (ref 98–110)
CO2 SERPL-SCNC: 20.1 MMOL/L (ref 22.8–31.6)
CREATININE: 3.79 MG/DL (ref 0.6–1.4)
GLUCOSE: 107 MG/DL (ref 70–99)
POTASSIUM SERPL-SCNC: 4.4 MMOL/L (ref 3.5–5)
PROT SERPL-MCNC: 7.2 G/DL (ref 6–8.2)
SODIUM: 139 MMOL/L (ref 134–144)

## 2017-07-31 ENCOUNTER — TELEPHONE (OUTPATIENT)
Dept: HEMATOLOGY/ONCOLOGY | Facility: CLINIC | Age: 67
End: 2017-07-31